# Patient Record
Sex: MALE | Race: WHITE | NOT HISPANIC OR LATINO | Employment: STUDENT | URBAN - METROPOLITAN AREA
[De-identification: names, ages, dates, MRNs, and addresses within clinical notes are randomized per-mention and may not be internally consistent; named-entity substitution may affect disease eponyms.]

---

## 2017-03-10 ENCOUNTER — OFFICE VISIT (OUTPATIENT)
Dept: URGENT CARE | Facility: URGENT CARE | Age: 22
End: 2017-03-10
Payer: COMMERCIAL

## 2017-03-10 VITALS
BODY MASS INDEX: 18.37 KG/M2 | HEART RATE: 115 BPM | DIASTOLIC BLOOD PRESSURE: 95 MMHG | OXYGEN SATURATION: 97 % | TEMPERATURE: 99.2 F | WEIGHT: 128 LBS | SYSTOLIC BLOOD PRESSURE: 152 MMHG

## 2017-03-10 DIAGNOSIS — B49 FUNGAL INFECTION: Primary | ICD-10-CM

## 2017-03-10 PROCEDURE — 99203 OFFICE O/P NEW LOW 30 MIN: CPT | Performed by: NURSE PRACTITIONER

## 2017-03-10 RX ORDER — FLUCONAZOLE 150 MG/1
150 TABLET ORAL ONCE
Qty: 2 TABLET | Refills: 0 | Status: SHIPPED | OUTPATIENT
Start: 2017-03-10 | End: 2017-03-10

## 2017-03-10 RX ORDER — KETOCONAZOLE 20 MG/G
CREAM TOPICAL 2 TIMES DAILY
Qty: 30 G | Refills: 0 | Status: SHIPPED | OUTPATIENT
Start: 2017-03-10 | End: 2017-03-17

## 2017-03-10 NOTE — MR AVS SNAPSHOT
After Visit Summary   3/10/2017    Dale Zhang    MRN: [id number]           Patient Information     Date Of Birth          1995        Visit Information        Provider Department      3/10/2017 11:25 AM Natalia Walters NP [facility]        Today's Diagnoses     Fungal infection    -  1      Care Instructions      Fungal Skin Infection (Tinea)  A fungal infection is when too much fungus grows on or in the body. Fungus normally lives on the skin in small amounts and does not cause harm. But when too much grows on the skin, it causes an infection. This is also known as tinea. Fungal skin infections are common and not often serious.  The infection often starts as a small red area the size of a pea. The skin may turn dry and flaky. The area may itch. As the fungus grows, it spreads out in a red [place]. Because of how it looks, fungal skin infection is often called ringworm, but it is not caused by a worm. Fungal skin infections can occur on many parts of the body. They can grow on the head, chest, arms, or legs. They can occur on the buttocks. On the feet, fungal infection is known as  athlete s foot.  It causes itchy, sometimes painful sores between the toes and the bottom or sides of the feet. In the groin, the rash is called  jock itch.   People with weakened immune systems can get a fungal infection more easily. This includes people with diabetes or HIV, or who are being treated for cancer. In these cases, the fungal infection can spread and cause severe illness. Fungal infections are also more common in people who are obese.  In most cases, treatment is done with antifungal cream or ointment. If the infection is on your scalp, you may take oral medication. In some cases, a tiny piece of the skin (biopsy) may be taken. This is so it can be tested in a lab.  Common fungal infections are treated with creams on the skin or oral medicine.  Home care  Follow all instructions when  using antifungal cream or ointment on your skin. The health care provider may advise using cornstarch powder to keep your skin dry or petroleum jelly to provide a barrier.  General care:    If you were prescribed an oral medicine, read the patient information. Talk with the health care provider about the risks and side effects.    Let your skin dry completely after bathing. Carefully dry your feet and between your toes.    Dress in loose cotton clothing.    Don t scratch the affected area. This can delay healing and may spread the infection. It can also cause a bacterial infection.    Keep your skin clean, but don t wash the skin too much. This can irritate your skin.    Keep in mind that it may take a week before the fungus starts to go away. It can take 2 to 4 weeks to fully clear. To prevent it from coming back, use the medicine until the rash is all gone.  Follow-up care  Follow up with your health care provider if the rash does not get better after 10 days of treatment. Also follow up if the rash spreads to other parts of your body.  When to seek medical advice  Call your health care provider right away if any of these occur:    Fever of 100.4 F (38 C) or higher    Redness or swelling that gets worse    Pain that gets worse    Foul-smelling fluid leaking from the skin       3811-2178 [employer]. [address] [place] [address], [place] [address]. All rights reserved. This information is not intended as a substitute for professional medical care. Always follow your healthcare professional's instructions.              Follow-ups after your visit        Who to contact     If you have questions or need follow up information about today's clinic visit or your schedule please contact [facility] directly at [phone].  Normal or non-critical lab and imaging results will be communicated to you by MyChart, letter or phone within 4 business days after the clinic has received the results. If  "you do not hear from us within 7 days, please contact the clinic through [employer] or phone. If you have a critical or abnormal lab result, we will notify you by phone as soon as possible.  Submit refill requests through [employer] or call your pharmacy and they will forward the refill request to us. Please allow 3 business days for your refill to be completed.          Additional Information About Your Visit        [employer] Information     [employer] lets you send messages to your doctor, view your test results, renew your prescriptions, schedule appointments and more. To sign up, go to [email or web address] . Click on \"Log in\" on the left side of the screen, which will take you to the Welcome page. Then click on \"Sign up Now\" on the right side of the page.     You will be asked to enter the access code listed below, as well as some personal information. Please follow the directions to create your username and password.     Your access code is: [email or web address]  Expires: 2017 12:37 PM     Your access code will  in 90 days. If you need help or a new code, please call your [place] clinic or [phone].        Care EveryWhere ID     This is your Care EveryWhere ID. This could be used by other organizations to access your [place] medical records  [id number]        Your Vitals Were     Pulse Temperature Pulse Oximetry BMI (Body Mass Index)          115 99.2  F (37.3  C) (Oral) 97% 18.37 kg/m2         Blood Pressure from Last 3 Encounters:   03/10/17 (!) 152/95   12 121/60   11 126/70    Weight from Last 3 Encounters:   03/10/17 128 lb (58.1 kg)   12 130 lb (59 kg) (17 %)*   11 128 lb (58.1 kg) (17 %)*     * Growth percentiles are based on CDC 2-20 Years data.              Today, you had the following     No orders found for display         Today's Medication Changes          These changes are accurate as of: 3/10/17 12:37 PM.  If you have any questions, ask your nurse or doctor.             "   Start taking these medicines.        Dose/Directions    fluconazole 150 MG tablet   Commonly known as:  DIFLUCAN   Used for:  Fungal infection   Started by:  Natalia Walters NP        Dose:  150 mg   Take 1 tablet (150 mg) by mouth once for 1 dose   Quantity:  2 tablet   Refills:  0       ketoconazole 2 % cream   Commonly known as:  NIZORAL   Used for:  Fungal infection   Started by:  Natalia Walters NP        Apply topically 2 times daily for 7 days   Quantity:  30 g   Refills:  0            Where to get your medicines      These medications were sent to [facility] [address] [facility] [address] [facility] SEC of [facility]  [address] [facility], [facility] [address]     Phone:  [phone]     fluconazole 150 MG tablet    ketoconazole 2 % cream                Primary Care Provider Office Phone # Fax #    Darrius Buenrostro [phone]       [facility] [address] [facility] [address]        Thank you!     Thank you for choosing [facility]  for your care. Our goal is always to provide you with excellent care. Hearing back from our patients is one way we can continue to improve our services. Please take a few minutes to complete the written survey that you may receive in the mail after your visit with us. Thank you!             Your Updated Medication List - Protect others around you: Learn how to safely use, store and throw away your medicines at www.disposemymeds.org.          This list is accurate as of: 3/10/17 12:37 PM.  Always use your most recent med list.                   Brand Name Dispense Instructions for use    clindamycin-benzoyl peroxide gel    BENZACLIN    50 g    Apply  topically 2 times daily. To the face, chest and back.       fluconazole 150 MG tablet    DIFLUCAN    2 tablet    Take 1 tablet (150 mg) by mouth once for 1 dose       ketoconazole 2 % cream    NIZORAL    30 g    Apply topically 2 times daily for 7  days       oseltamivir 75 MG capsule    TAMIFLU    10 capsule    Take 1 capsule by mouth 2 times daily.

## 2017-03-10 NOTE — NURSING NOTE
"No chief complaint on file.      Initial BP (!) 152/95 (BP Location: Left arm, Patient Position: Chair, Cuff Size: Adult Regular)  Pulse 115  Temp 99.2  F (37.3  C) (Oral)  Wt 128 lb (58.1 kg)  SpO2 97%  BMI 18.37 kg/m2 Estimated body mass index is 18.37 kg/(m^2) as calculated from the following:    Height as of 6/5/12: 5' 10\" (1.778 m).    Weight as of this encounter: 128 lb (58.1 kg).  Medication Reconciliation: complete     Gayle Trent CMA (AAMA)      "

## 2017-03-10 NOTE — PROGRESS NOTES
SUBJECTIVE:                                                    Dale Zhang is a 22 year old male who presents to clinic today for the following health issues:    Genital Irritation      Duration: last night    Description (location/character/radiation): Pt was masturbating yesterday evening and he used a lotion that irritated his foreskin and penis.     Intensity:  moderate    Accompanying signs and symptoms: swelling, redness, puffy, burning    History (similar episodes/previous evaluation): None    Precipitating or alleviating factors: None    Therapies tried and outcome: cleaning of the lotion- little relief.             Allergies   Allergen Reactions     Motrin [Ibuprofen] Unknown     Liquid- as a child.      Red Dye        Past Medical History   Diagnosis Date     Acne vulgaris      Allergies      Asthma, mild intermittent      Asthma, mild persistent 12/2005     Environmental allergies          Current Outpatient Prescriptions on File Prior to Visit:  oseltamivir (TAMIFLU) 75 MG capsule Take 1 capsule by mouth 2 times daily. (Patient not taking: Reported on 3/10/2017)   clindamycin-benzoyl peroxide (BENZACLIN) gel Apply  topically 2 times daily. To the face, chest and back. (Patient not taking: Reported on 3/10/2017)     No current facility-administered medications on file prior to visit.     Social History   Substance Use Topics     Smoking status: Never Smoker     Smokeless tobacco: Never Used     Alcohol use No       ROS:  General: negative for fever  SKIN: + as above    Physcial Exam:  BP (!) 152/95 (BP Location: Left arm, Patient Position: Chair, Cuff Size: Adult Regular)  Pulse 115  Temp 99.2  F (37.3  C) (Oral)  Wt 128 lb (58.1 kg)  SpO2 97%  BMI 18.37 kg/m2    GENERAL: alert, no acute distress  EYES: conjunctival clear  RESP: Regular breathing rate  NEURO: awake .  SKIN: excoriated penile shaft and glans of penis with white debri, tender to touch    ASSESSMENT:    ICD-10-CM    1. Fungal  infection B49 ketoconazole (NIZORAL) 2 % cream     fluconazole (DIFLUCAN) 150 MG tablet       PLAN:  See today's orders.  Follow-up with primary clinic if not improving.  Advised about symptoms which might herald more serious problems.    Natalia Walters  [facility]  Family Nurse Practitoner

## 2017-03-10 NOTE — PATIENT INSTRUCTIONS
Fungal Skin Infection (Tinea)  A fungal infection is when too much fungus grows on or in the body. Fungus normally lives on the skin in small amounts and does not cause harm. But when too much grows on the skin, it causes an infection. This is also known as tinea. Fungal skin infections are common and not often serious.  The infection often starts as a small red area the size of a pea. The skin may turn dry and flaky. The area may itch. As the fungus grows, it spreads out in a red [place]. Because of how it looks, fungal skin infection is often called ringworm, but it is not caused by a worm. Fungal skin infections can occur on many parts of the body. They can grow on the head, chest, arms, or legs. They can occur on the buttocks. On the feet, fungal infection is known as  athlete s foot.  It causes itchy, sometimes painful sores between the toes and the bottom or sides of the feet. In the groin, the rash is called  jock itch.   People with weakened immune systems can get a fungal infection more easily. This includes people with diabetes or HIV, or who are being treated for cancer. In these cases, the fungal infection can spread and cause severe illness. Fungal infections are also more common in people who are obese.  In most cases, treatment is done with antifungal cream or ointment. If the infection is on your scalp, you may take oral medication. In some cases, a tiny piece of the skin (biopsy) may be taken. This is so it can be tested in a lab.  Common fungal infections are treated with creams on the skin or oral medicine.  Home care  Follow all instructions when using antifungal cream or ointment on your skin. The health care provider may advise using cornstarch powder to keep your skin dry or petroleum jelly to provide a barrier.  General care:    If you were prescribed an oral medicine, read the patient information. Talk with the health care provider about the risks and side effects.    Let your skin dry  completely after bathing. Carefully dry your feet and between your toes.    Dress in loose cotton clothing.    Don t scratch the affected area. This can delay healing and may spread the infection. It can also cause a bacterial infection.    Keep your skin clean, but don t wash the skin too much. This can irritate your skin.    Keep in mind that it may take a week before the fungus starts to go away. It can take 2 to 4 weeks to fully clear. To prevent it from coming back, use the medicine until the rash is all gone.  Follow-up care  Follow up with your health care provider if the rash does not get better after 10 days of treatment. Also follow up if the rash spreads to other parts of your body.  When to seek medical advice  Call your health care provider right away if any of these occur:    Fever of 100.4 F (38 C) or higher    Redness or swelling that gets worse    Pain that gets worse    Foul-smelling fluid leaking from the skin       3618-5519 The "[employer]. [address] [place] [address], [place] [address]. All rights reserved. This information is not intended as a substitute for professional medical care. Always follow your healthcare professional's instructions.

## 2017-06-09 ENCOUNTER — TELEPHONE (OUTPATIENT)
Dept: FAMILY MEDICINE | Facility: CLINIC | Age: 22
End: 2017-06-09

## 2017-06-09 ENCOUNTER — OFFICE VISIT (OUTPATIENT)
Dept: FAMILY MEDICINE | Facility: CLINIC | Age: 22
End: 2017-06-09
Payer: COMMERCIAL

## 2017-06-09 VITALS
HEART RATE: 98 BPM | WEIGHT: 128 LBS | BODY MASS INDEX: 18.32 KG/M2 | TEMPERATURE: 97 F | SYSTOLIC BLOOD PRESSURE: 132 MMHG | OXYGEN SATURATION: 97 % | DIASTOLIC BLOOD PRESSURE: 81 MMHG | HEIGHT: 70 IN

## 2017-06-09 DIAGNOSIS — Z00.00 ENCOUNTER FOR ROUTINE ADULT HEALTH EXAMINATION WITHOUT ABNORMAL FINDINGS: Primary | ICD-10-CM

## 2017-06-09 DIAGNOSIS — Z01.84 IMMUNITY STATUS TESTING: ICD-10-CM

## 2017-06-09 DIAGNOSIS — Z11.1 SCREENING FOR TUBERCULOSIS: ICD-10-CM

## 2017-06-09 LAB
HBV SURFACE AB SERPL IA-ACNC: 81.49 M[IU]/ML
VZV IGG SER QL IA: 2.4 AI (ref 0–0.8)

## 2017-06-09 PROCEDURE — 86787 VARICELLA-ZOSTER ANTIBODY: CPT | Performed by: PHYSICIAN ASSISTANT

## 2017-06-09 PROCEDURE — 99395 PREV VISIT EST AGE 18-39: CPT | Performed by: PHYSICIAN ASSISTANT

## 2017-06-09 PROCEDURE — 86706 HEP B SURFACE ANTIBODY: CPT | Performed by: PHYSICIAN ASSISTANT

## 2017-06-09 PROCEDURE — 36415 COLL VENOUS BLD VENIPUNCTURE: CPT | Performed by: PHYSICIAN ASSISTANT

## 2017-06-09 PROCEDURE — 86480 TB TEST CELL IMMUN MEASURE: CPT | Performed by: PHYSICIAN ASSISTANT

## 2017-06-09 NOTE — MR AVS SNAPSHOT
After Visit Summary   6/9/2017    Dale Zhang    MRN: [id number]           Patient Information     Date Of Birth          1995        Visit Information        Provider Department      6/9/2017 9:00 AM Cindy Jean Baptiste PA-C [facility]        Today's Diagnoses     Encounter for routine adult health examination without abnormal findings    -  1    Immunity status testing        Screening for tuberculosis          Care Instructions      Preventive Health Recommendations  Male Ages 18 - 25     Yearly exam:             See your health care provider every year in order to  o   Review health changes.   o   Discuss preventive care.    o   Review your medicines if your doctor has prescribed any.    You should be tested each year for STDs (sexually transmitted diseases).     Talk to your provider about cholesterol testing.      If you are at risk for diabetes, you should have a diabetes test (fasting glucose).    Shots: Get a flu shot each year. Get a tetanus shot every 10 years.     Nutrition:    Eat at least 5 servings of fruits and vegetables daily.     Eat whole-grain bread, whole-wheat pasta and brown rice instead of white grains and rice.     Talk to your provider about calcium and Vitamin D.     Lifestyle    Exercise for at least 150 minutes a week (30 minutes a day, 5 days a week). This will help you control your weight and prevent disease.     Limit alcohol to one drink per day.     No smoking.     Wear sunscreen to prevent skin cancer.     See your dentist every six months for an exam and cleaning.             Follow-ups after your visit        Who to contact     If you have questions or need follow up information about today's clinic visit or your schedule please contact [facility] directly at [phone].  Normal or non-critical lab and imaging results will be communicated to you by MyChart, letter or phone within 4 business days after the clinic has  "received the results. If you do not hear from us within 7 days, please contact the clinic through [employer] or phone. If you have a critical or abnormal lab result, we will notify you by phone as soon as possible.  Submit refill requests through [employer] or call your pharmacy and they will forward the refill request to us. Please allow 3 business days for your refill to be completed.          Additional Information About Your Visit        [employer] Information     [employer] lets you send messages to your doctor, view your test results, renew your prescriptions, schedule appointments and more. To sign up, go to [email or web address] . Click on \"Log in\" on the left side of the screen, which will take you to the Welcome page. Then click on \"Sign up Now\" on the right side of the page.     You will be asked to enter the access code listed below, as well as some personal information. Please follow the directions to create your username and password.     Your access code is: [email or web address]  Expires: 2017  9:22 AM     Your access code will  in 90 days. If you need help or a new code, please call your [place] clinic or [phone].        Care EveryWhere ID     This is your Care EveryWhere ID. This could be used by other organizations to access your [place] medical records  [id number]        Your Vitals Were     Pulse Temperature Height Pulse Oximetry BMI (Body Mass Index)       98 97  F (36.1  C) (Oral) 5' 10\" (1.778 m) 97% 18.37 kg/m2        Blood Pressure from Last 3 Encounters:   17 132/81   03/10/17 (!) 152/95   12 121/60    Weight from Last 3 Encounters:   17 128 lb (58.1 kg)   03/10/17 128 lb (58.1 kg)   12 130 lb (59 kg) (17 %)*     * Growth percentiles are based on CDC 2-20 Years data.              We Performed the Following     Hepatitis B Surface Antibody     M Tuberculosis by Quantiferon     Varicella Zoster Virus Antibody IgG          Today's Medication Changes          These " changes are accurate as of: 6/9/17  9:22 AM.  If you have any questions, ask your nurse or doctor.               Stop taking these medicines if you haven't already. Please contact your care team if you have questions.     clindamycin-benzoyl peroxide gel   Commonly known as:  BENZACLIN   Stopped by:  Cindy Jean Baptiste PA-C           oseltamivir 75 MG capsule   Commonly known as:  TAMIFLU   Stopped by:  Cindy Jean Baptiste PA-C                    Primary Care Provider Office Phone # Fax #    Darrius Buenrostro [phone]       [facility] [address] [facility] [address]        Thank you!     Thank you for choosing [facility]  for your care. Our goal is always to provide you with excellent care. Hearing back from our patients is one way we can continue to improve our services. Please take a few minutes to complete the written survey that you may receive in the mail after your visit with us. Thank you!             Your Updated Medication List - Protect others around you: Learn how to safely use, store and throw away your medicines at www.disposemymeds.org.      Notice  As of 6/9/2017  9:22 AM    You have not been prescribed any medications.

## 2017-06-09 NOTE — PROGRESS NOTES
SUBJECTIVE:     CC: Dale Zhang is an 22 year old male who presents for preventative health visit.     Healthy Habits:    Do you get at least three servings of calcium containing foods daily (dairy, green leafy vegetables, etc.)? yes    Amount of exercise or daily activities, outside of work: 2 day(s) per week    Problems taking medications regularly No    Medication side effects: No    Have you had an eye exam in the past two years? Going in next week    Do you see a dentist twice per year? no    Do you have sleep apnea, excessive snoring or daytime drowsiness?no    Needs titers and TB screening for school. He has forms to be filled out. 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         Today's PHQ-2 Score:   PHQ-2 ( 1999 [employer]) 6/9/2017 12/9/2011   Q1: Little interest or pleasure in doing things 0 0   Q2: Feeling down, depressed or hopeless 0 0   PHQ-2 Score 0 0       Abuse: Current or Past(Physical, Sexual or Emotional)- No  Do you feel safe in your environment - Yes    Social History   Substance Use Topics     Smoking status: Never Smoker     Smokeless tobacco: Never Used     Alcohol use No     The patient does not drink >3 drinks per day nor >7 drinks per week.    Last PSA: No results found for: PSA    No results for input(s): CHOL, HDL, LDL, TRIG, CHOLHDLRATIO, NHDL in the last 15427 hours.    Reviewed orders with patient. Reviewed health maintenance and updated orders accordingly - Yes    Reviewed and updated as needed this visit by clinical staff  Tobacco  Allergies  Meds  Med Hx  Surg Hx  Fam Hx  Soc Hx        Reviewed and updated as needed this visit by Provider            ROS:  C: NEGATIVE for fever, chills, change in weight  I: NEGATIVE for worrisome rashes, moles or lesions  E: NEGATIVE for vision changes or irritation  ENT: NEGATIVE for ear, mouth and throat problems  R: NEGATIVE for significant cough or SOB  CV: NEGATIVE for chest pain, palpitations or peripheral edema  GI: NEGATIVE for  "nausea, abdominal pain, heartburn, or change in bowel habits   male: negative for dysuria, hematuria, decreased urinary stream, erectile dysfunction, urethral discharge  M: NEGATIVE for significant arthralgias or myalgia  N: NEGATIVE for weakness, dizziness or paresthesias  P: NEGATIVE for changes in mood or affect    Problem list, Medication list, Allergies, and Medical/Social/Surgical histories reviewed in [facility] and updated as appropriate.  BP Readings from Last 3 Encounters:   06/09/17 132/81   03/10/17 (!) 152/95   06/05/12 121/60    Wt Readings from Last 3 Encounters:   06/09/17 128 lb (58.1 kg)   03/10/17 128 lb (58.1 kg)   06/05/12 130 lb (59 kg) (17 %)*     * Growth percentiles are based on [facility] 2-20 Years data.                  Patient Active Problem List   Diagnosis     Acne vulgaris     Environmental allergies     Verruca vulgaris     Past Surgical History:   Procedure Laterality Date     PE TUBES         Social History   Substance Use Topics     Smoking status: Never Smoker     Smokeless tobacco: Never Used     Alcohol use No     Family History   Problem Relation Age of Onset     Hypertension Father      DIABETES Father      DIABETES Paternal Grandmother      DIABETES Paternal Uncle          No current outpatient prescriptions on file.     Allergies   Allergen Reactions     Motrin [Ibuprofen] Unknown     Liquid- as a child.      Red Dye      OBJECTIVE:     /81  Pulse 98  Temp 97  F (36.1  C) (Oral)  Ht 5' 10\" (1.778 m)  Wt 128 lb (58.1 kg)  SpO2 97%  BMI 18.37 kg/m2  EXAM:  GENERAL: healthy, alert and no distress  EYES: Eyes grossly normal to inspection, PERRL and conjunctivae and sclerae normal  HENT: ear canals and TM's normal, nose and mouth without ulcers or lesions  NECK: no adenopathy, no asymmetry, masses, or scars and thyroid normal to palpation  RESP: lungs clear to auscultation - no rales, rhonchi or wheezes  CV: regular rate and rhythm, normal S1 S2, no S3 or S4, no murmur, click " "or rub, no peripheral edema and peripheral pulses strong  ABDOMEN: soft, nontender, no hepatosplenomegaly, no masses and bowel sounds normal   (male): deferred  MS: no gross musculoskeletal defects noted, no edema  SKIN: no suspicious lesions or rashes  NEURO: Normal strength and tone, mentation intact and speech normal  PSYCH: mentation appears normal, affect normal/bright    ASSESSMENT/PLAN:         ICD-10-CM    1. Encounter for routine adult health examination without abnormal findings Z00.00    2. Immunity status testing Z01.84 Varicella Zoster Virus Antibody IgG     Hepatitis B Surface Antibody   3. Screening for tuberculosis Z11.1 M Tuberculosis by Quantiferon       COUNSELING:  Reviewed preventive health counseling, as reflected in patient instructions       Regular exercise       Healthy diet/nutrition         reports that he has never smoked. He has never used smokeless tobacco.    Estimated body mass index is 18.37 kg/(m^2) as calculated from the following:    Height as of this encounter: 5' 10\" (1.778 m).    Weight as of this encounter: 128 lb (58.1 kg).       Counseling Resources:  ATP IV Guidelines  Pooled Cohorts Equation Calculator  FRAX Risk Assessment  ICSI Preventive Guidelines  Dietary Guidelines for Americans, 2010  USDA's MyPlate  ASA Prophylaxis  Lung CA Screening    Cindy Jean Baptiste PA-C  [facility]  "

## 2017-06-09 NOTE — TELEPHONE ENCOUNTER
Forms are completed for the patient for school. TC has the forms. Once lab results are finalized and are normal, please attach to forms and inform patient they are ready for . Thank you.    Cindy Jean Baptiste PA-C

## 2017-06-09 NOTE — LETTER
[place] [id number] [employer] [id number] [phone]        June 13, 2017    Dale RUSSELL Zhang  [address] [address] [address]            Dear Dale,    Your provider is out of the clinic.  Your recent labs were positive for immunity to Hepatitis B and chicken pox. Your TB test was negative.   If you have any questions, please contact your provider next week.     Ingris Mark MD/steffen    Results for orders placed or performed in visit on 06/09/17   Varicella Zoster Virus Antibody IgG   Result Value Ref Range    Varicella Zoster Virus Antibody IgG 2.4 (H) 0.0 - 0.8 AI   M Tuberculosis by Quantiferon   Result Value Ref Range    M Tuberculosis Result Negative NEG    M Tuberculosis Antigen Value 0.00 IU/mL   Hepatitis B Surface Antibody   Result Value Ref Range    Hepatitis B Surface Antibody 81.49 (H) <8.00 m[IU]/mL

## 2017-06-09 NOTE — NURSING NOTE
"Chief Complaint   Patient presents with     Physical       Initial /81  Pulse 98  Temp 97  F (36.1  C) (Oral)  Ht 5' 10\" (1.778 m)  Wt 128 lb (58.1 kg)  SpO2 97%  BMI 18.37 kg/m2 Estimated body mass index is 18.37 kg/(m^2) as calculated from the following:    Height as of this encounter: 5' 10\" (1.778 m).    Weight as of this encounter: 128 lb (58.1 kg).  Medication Reconciliation: complete     Tyesha Crane cma       "

## 2017-06-12 LAB
M TB TUBERC IFN-G BLD QL: NEGATIVE
M TB TUBERC IFN-G/MITOGEN IGNF BLD: 0 IU/ML

## 2017-06-13 NOTE — TELEPHONE ENCOUNTER
Forms completed, lab reports printed for each requested test. LM for patient letting him know this is now at the  for patient to .    Left direct call back number for questions.    Bathseva Simpson,

## 2017-06-15 ENCOUNTER — TELEPHONE (OUTPATIENT)
Dept: FAMILY MEDICINE | Facility: CLINIC | Age: 22
End: 2017-06-15

## 2017-06-15 NOTE — TELEPHONE ENCOUNTER
Reason for call:  Form   Our goal is to have forms completed within 72 hours, however some forms may require a visit or additional information.     Who is the form from? Patient  Where did the form come from? Patient or family brought in     What clinic location was the form placed at?   Where was the form placed? 's Box  What number is listed as a contact on the form? [phone]    Phone call message - patient request for a letter, form or note:     Date needed: at your convenience  Please mail to -yevgeniy rodríguez and pt would like to  at    Has the patient signed a consent form for release of information? Not Applicable    Additional comments: mail forms and leave a copy for pt to  at

## 2017-06-15 NOTE — TELEPHONE ENCOUNTER
Patient called back, all that needs to be done is Cindy needs to initial the patient's family history form. He states this can wait until Cindy is back on Monday.    Then it needs to be mailed in the provided envelope and a copy of the forms for the patient.    Batsheva Simpson,

## 2017-06-20 NOTE — TELEPHONE ENCOUNTER
Cindy initialed the form, the form and records were mailed in envelope patient provided with postage already on it.    Copy of the forms were left at the  for patient to  when he is able.    Notified patient this was done.    Batsheva Simpson,

## 2020-06-08 ENCOUNTER — ALLIED HEALTH/NURSE VISIT (OUTPATIENT)
Dept: NURSING | Facility: CLINIC | Age: 25
End: 2020-06-08

## 2020-06-08 DIAGNOSIS — Z11.1 SCREENING EXAMINATION FOR PULMONARY TUBERCULOSIS: Primary | ICD-10-CM

## 2020-06-08 PROCEDURE — 86580 TB INTRADERMAL TEST: CPT

## 2020-06-08 PROCEDURE — 99207 ZZC NO CHARGE NURSE ONLY: CPT

## 2020-06-08 NOTE — PROGRESS NOTES
The patient is asked the following questions today and these are his answers:    -Have you had a mantoux administered in the past 30 days?    No  -Have you had a previous positive Mantoux.  No  -Have you received BCG in the past.  No  -Have you had a live vaccine  (MMR, Varicella, OPV, Yellow Fever) in the last 6 weeks.  No  -Have you had and active  viral or bacterial infection in the past 6 weeks.  No  -Have you received corticosteroids or immunosuppressive agents in the past 6 weeks.  No  -Have you been diagnosed with HIV?  No  -Do you have a malignancy?  No    Mantoux Questionnaire: answers were all negative.  Cindi Cobb MA

## 2020-06-10 ENCOUNTER — ALLIED HEALTH/NURSE VISIT (OUTPATIENT)
Dept: NURSING | Facility: CLINIC | Age: 25
End: 2020-06-10

## 2020-06-10 DIAGNOSIS — Z11.1 SCREENING EXAMINATION FOR PULMONARY TUBERCULOSIS: Primary | ICD-10-CM

## 2020-06-10 LAB
PPDINDURATION: 0 MM (ref 0–5)
PPDREDNESS: 0 MM

## 2020-06-10 PROCEDURE — 99207 ZZC NO CHARGE NURSE ONLY: CPT

## 2021-01-06 ENCOUNTER — MYC MEDICAL ADVICE (OUTPATIENT)
Dept: FAMILY MEDICINE | Facility: CLINIC | Age: 26
End: 2021-01-06

## 2021-01-06 NOTE — TELEPHONE ENCOUNTER
Patient has not seen Dr. Darrius Buenrostro since 2011.  Patient needs to be seen in person and will not be able to do a virtual visit for this.   [employer] message sent to patient.      Johanna GREWAL, RN

## 2021-01-07 ENCOUNTER — OFFICE VISIT (OUTPATIENT)
Dept: FAMILY MEDICINE | Facility: CLINIC | Age: 26
End: 2021-01-07
Payer: COMMERCIAL

## 2021-01-07 VITALS
BODY MASS INDEX: 17.9 KG/M2 | TEMPERATURE: 99.4 F | HEIGHT: 70 IN | HEART RATE: 103 BPM | WEIGHT: 125 LBS | DIASTOLIC BLOOD PRESSURE: 94 MMHG | SYSTOLIC BLOOD PRESSURE: 137 MMHG

## 2021-01-07 DIAGNOSIS — F41.1 GAD (GENERALIZED ANXIETY DISORDER): Primary | ICD-10-CM

## 2021-01-07 PROCEDURE — 99214 OFFICE O/P EST MOD 30 MIN: CPT | Performed by: FAMILY MEDICINE

## 2021-01-07 RX ORDER — NALTREXONE HYDROCHLORIDE 50 MG/1
25 TABLET, FILM COATED ORAL
COMMUNITY
Start: 2021-01-03

## 2021-01-07 RX ORDER — LANOLIN ALCOHOL/MO/W.PET/CERES
100 CREAM (GRAM) TOPICAL
COMMUNITY
Start: 2021-01-04

## 2021-01-07 RX ORDER — ESCITALOPRAM OXALATE 5 MG/1
5 TABLET ORAL DAILY
Qty: 30 TABLET | Refills: 1 | Status: SHIPPED | OUTPATIENT
Start: 2021-01-07 | End: 2021-02-22

## 2021-01-07 ASSESSMENT — PAIN SCALES - GENERAL: PAINLEVEL: NO PAIN (0)

## 2021-01-07 ASSESSMENT — MIFFLIN-ST. JEOR: SCORE: 1548.25

## 2021-01-07 NOTE — NURSING NOTE
"Chief Complaint   Patient presents with     [facility] F/U     Health Maintenance     order pended, PHQ2, Physical       Initial BP (!) 137/94   Pulse 103   Temp 99.4  F (37.4  C) (Tympanic)   Ht 1.778 m (5' 10\")   Wt 56.7 kg (125 lb)   BMI 17.94 kg/m   Estimated body mass index is 17.94 kg/m  as calculated from the following:    Height as of this encounter: 1.778 m (5' 10\").    Weight as of this encounter: 56.7 kg (125 lb).  Medication Reconciliation: complete  Tyesha Crane, BELA  "

## 2021-01-07 NOTE — PATIENT INSTRUCTIONS
Say manju to insomnia           Patient Education     Sty (or Stye)  A sty is an infection of the oil gland of the eyelid. It may develop into a small pocket of pus (an abscess). This can cause pain, redness, and swelling. In early stages, a sty is treated with antibiotic cream, eye drops, or a small towel soaked in warm water (a warm compress). More severe cases may need to be opened and drained by a healthcare provider.  Home care    Eye drops or ointment are usually prescribed to treat the infection. Use these as directed.     Artificial tears may also be used to lubricate the eye and make it more comfortable. You can buy these over the counter without a prescription. Talk with your healthcare provider before using any over-the-counter treatment for a sty.    Apply a warm, damp towel to the affected eye for at least 5 minutes, 3 to 4 times a day for a week. Warm compresses open the pores and speed the healing. But if the compresses are too hot, they may burn your eyelid.    Sometimes the sty will drain with this treatment alone. If this happens, keep using the antibiotic until all the redness and swelling are gone.    Wash your hands before and after touching the infected eyelid to avoid spreading the infection.    Don t squeeze or try to break open the sty.  Follow-up care  Follow up with your healthcare provider, or as advised.   When to seek medical advice  Call your healthcare provider right away if any of these occur:    Increase in swelling or redness around the eyelid after 48 to 72 hours    Increase in eye pain or the eyelid blisters    Increase in warmth--the eyelid feels hot    Drainage of blood or thick pus from the sty    Blister on the eyelid    Inability to open the eyelid due to swelling    Fever of 100.4 F (38 C) or above, or as directed by your provider    Vision changes    Headache or stiff neck    The sty comes back  [employer] last reviewed this educational content on 8/1/2017 2000-2020  [employer]. [address] [place] [address], [place] [address]. All rights reserved. This information is not intended as a substitute for professional medical care. Always follow your healthcare professional's instructions.

## 2021-01-07 NOTE — PROGRESS NOTES
Subjective     Dale is a 26 year old who presents to clinic today for the following health issues     HPI         Hospital Follow-up Visit:    Hospital/Nursing Home/IP Rehab Facility: [facility]  Date of Admission: 1/2/21  Date of Discharge: 1/3/21  Reason(s) for Admission: Seizure      Was your hospitalization related to COVID-19? No   Problems taking medications regularly:  None  Medication changes since discharge: None  Problems adhering to non-medication therapy:  None    Summary of hospitalization:  CareEverywhere information obtained and reviewed  Diagnostic Tests/Treatments reviewed.  Follow up needed: platelets  Other Healthcare Providers Involved in Patient s Care:         .  Update since discharge: improved. Post Discharge Medication Reconciliation: discharge medications reconciled, continue medications without change.  Plan of care communicated with patient                  Review of Systems         Objective    There were no vitals taken for this visit.  There is no height or weight on file to calculate BMI.  Physical Exam                 --------------------------------------------------------------------------------------------------------------------------------------  Subjective:  Dale is a 26 year old male who presents today for follow-up on a recent hospitalization at [facility] in [place]. He was admitted to the hospital on 1-2-21 and discharged on 1-3-21. The patient went to the hospital after having a witnessed seizure(s) . He was diagnosed with alcohol withdrawal seizure(s) . The patient was treated with naltrexone and thiamine. Naltrexone was just started 2 days ago. He was asked to follow up today specifically to check up on his general well condition. At this time the patient reports a lot of improvement of the original symptoms. In addition the patient reports the following new symptoms:none.     He has not had any alcohol since he left the hospital   He was  "drinkinh heavily for 6 month(s) prior to this event.  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 He had difficulty studying for boards which he ended up failing.      He was on escitalopram 20 mg but he had side effects of delayed orgasm     PHQ-9 score: 14        ALISIA-7    Over the last 2 weeks, how often have you been bothered by the following problems?  (Use an \"x\" to indicate your answer) Not at all                (0) Several days                (1) More than half the days        (2) Nearly every day          (3)   1. Feeling nervous, anxious or on edge    x   2. Not being able to stop or control worrying    x   3. Worrying too much about different things   x    4. Trouble relaxing  x     5. Being so restless that it is hard to sit still  x     6. Becoming easily annoyed or irritable   x    7. Feeling afraid as if something awful might happen   x          Total_______    Cut points for:   Mild Anxiety =  5  Moderate= 10  Severe=  15                        Current Outpatient Medications:      naltrexone (DEPADE/REVIA) 50 MG tablet, Take 25 mg by mouth, Disp: , Rfl:      thiamine (B-1) 100 MG tablet, Take 100 mg by mouth, Disp: , Rfl:     Past Medical History:   Diagnosis Date     Acne vulgaris      Allergies      Asthma, mild intermittent      Asthma, mild persistent 12/2005     Environmental allergies        OBJECTIVE:  BP (!) 137/94   Pulse 103   Temp 99.4  F (37.4  C) (Tympanic)   Ht 1.778 m (5' 10\")   Wt 56.7 kg (125 lb)   BMI 17.94 kg/m      ASSESSMENT:  26 year old male who recently underwent hospitalization for ETOH withdawl seizure(s)         Plan: At this time " we start escitalopram 5 mg continue(s) the naltrexone and thiamine  Follow up in a month(s) for a recheck   We will do labs at that visit including a cbc, liver function tests and thiamine level.     Dale  voiced understanding to informations discussed today.

## 2021-01-08 ASSESSMENT — ANXIETY QUESTIONNAIRES
1. FEELING NERVOUS, ANXIOUS, OR ON EDGE: NEARLY EVERY DAY
3. WORRYING TOO MUCH ABOUT DIFFERENT THINGS: MORE THAN HALF THE DAYS
6. BECOMING EASILY ANNOYED OR IRRITABLE: MORE THAN HALF THE DAYS
2. NOT BEING ABLE TO STOP OR CONTROL WORRYING: NEARLY EVERY DAY
7. FEELING AFRAID AS IF SOMETHING AWFUL MIGHT HAPPEN: MORE THAN HALF THE DAYS
GAD7 TOTAL SCORE: 14
IF YOU CHECKED OFF ANY PROBLEMS ON THIS QUESTIONNAIRE, HOW DIFFICULT HAVE THESE PROBLEMS MADE IT FOR YOU TO DO YOUR WORK, TAKE CARE OF THINGS AT HOME, OR GET ALONG WITH OTHER PEOPLE: SOMEWHAT DIFFICULT
5. BEING SO RESTLESS THAT IT IS HARD TO SIT STILL: SEVERAL DAYS

## 2021-01-08 ASSESSMENT — PATIENT HEALTH QUESTIONNAIRE - PHQ9
5. POOR APPETITE OR OVEREATING: SEVERAL DAYS
SUM OF ALL RESPONSES TO PHQ QUESTIONS 1-9: 14

## 2021-01-09 ASSESSMENT — ANXIETY QUESTIONNAIRES: GAD7 TOTAL SCORE: 14

## 2021-01-15 ENCOUNTER — HEALTH MAINTENANCE LETTER (OUTPATIENT)
Age: 26
End: 2021-01-15

## 2021-02-22 ENCOUNTER — TELEPHONE (OUTPATIENT)
Dept: FAMILY MEDICINE | Facility: CLINIC | Age: 26
End: 2021-02-22

## 2021-02-22 DIAGNOSIS — F41.1 GAD (GENERALIZED ANXIETY DISORDER): ICD-10-CM

## 2021-02-22 RX ORDER — ESCITALOPRAM OXALATE 5 MG/1
5 TABLET ORAL DAILY
Qty: 30 TABLET | Refills: 1 | Status: SHIPPED | OUTPATIENT
Start: 2021-02-22 | End: 2021-03-05

## 2021-02-22 NOTE — TELEPHONE ENCOUNTER
Patient is calling to get a refill for   escitalopram (LEXAPRO) 5 MG tablet 30-90 days  He is entering into a treatment facility this week and needs a new prescription(s) to bring with him.   He knows he was supposed to be seen for follow up but decided to enter treatment.  He would like thi sent to a different pharmacy as he is currently staying in [place]. Please sent to [facility] in [place].   Call patient to let him know when this is done.

## 2021-04-27 ENCOUNTER — IMMUNIZATION (OUTPATIENT)
Dept: NURSING | Facility: CLINIC | Age: 26
End: 2021-04-27
Payer: COMMERCIAL

## 2021-04-27 PROCEDURE — 0001A PR COVID VAC PFIZER DIL RECON 30 MCG/0.3 ML IM: CPT

## 2021-04-27 PROCEDURE — 91300 PR COVID VAC PFIZER DIL RECON 30 MCG/0.3 ML IM: CPT

## 2021-05-18 ENCOUNTER — IMMUNIZATION (OUTPATIENT)
Dept: NURSING | Facility: CLINIC | Age: 26
End: 2021-05-18
Attending: INTERNAL MEDICINE
Payer: COMMERCIAL

## 2021-05-18 PROCEDURE — 0002A PR COVID VAC PFIZER DIL RECON 30 MCG/0.3 ML IM: CPT

## 2021-05-18 PROCEDURE — 91300 PR COVID VAC PFIZER DIL RECON 30 MCG/0.3 ML IM: CPT

## 2021-08-18 ENCOUNTER — ALLIED HEALTH/NURSE VISIT (OUTPATIENT)
Dept: PEDIATRICS | Facility: CLINIC | Age: 26
End: 2021-08-18
Payer: COMMERCIAL

## 2021-08-18 DIAGNOSIS — Z11.1 ENCOUNTER FOR TUBERCULIN SKIN TEST: Primary | ICD-10-CM

## 2021-08-18 PROCEDURE — 86580 TB INTRADERMAL TEST: CPT

## 2021-08-18 PROCEDURE — 99207 PR NO CHARGE NURSE ONLY: CPT

## 2021-08-18 NOTE — PROGRESS NOTES
The patient is asked the following questions today and these are his answers:    -Have you had a mantoux administered in the past 30 days?    No  -Have you had a previous positive Mantoux.  No  -Have you received BCG in the past.  No  -Have you had a live vaccine  (MMR, Varicella, OPV, Yellow Fever) in the last 6 weeks.  No  -Have you had and active  viral or bacterial infection in the past 6 weeks.  No  -Have you received corticosteroids or immunosuppressive agents in the past 6 weeks.  No  -Have you been diagnosed with HIV?  No  -Do you have a malignancy?  No    Mantoux Questionnaire: answers were all negative.

## 2021-08-20 ENCOUNTER — ALLIED HEALTH/NURSE VISIT (OUTPATIENT)
Dept: PEDIATRICS | Facility: CLINIC | Age: 26
End: 2021-08-20
Payer: COMMERCIAL

## 2021-08-20 DIAGNOSIS — Z11.1 SCREENING EXAMINATION FOR PULMONARY TUBERCULOSIS: Primary | ICD-10-CM

## 2021-08-20 LAB
PPDINDURATION: 0 MM (ref 0–5)
PPDREDNESS: 1 MM

## 2021-08-20 PROCEDURE — 99207 PR NO CHARGE NURSE ONLY: CPT

## 2021-08-20 NOTE — PROGRESS NOTES
Mantoux result:  Lab Results   Component Value Date    PPDREDNESS 1 08/20/2021    PPDINDURATIO 0 08/20/2021     Is induration greater than 5mm?  No     Alfred Brennan RN on 8/20/2021 at 4:50 PM

## 2021-08-26 ENCOUNTER — ALLIED HEALTH/NURSE VISIT (OUTPATIENT)
Dept: FAMILY MEDICINE | Facility: CLINIC | Age: 26
End: 2021-08-26
Payer: COMMERCIAL

## 2021-08-26 DIAGNOSIS — Z23 NEED FOR VACCINATION: Primary | ICD-10-CM

## 2021-08-26 PROCEDURE — 90715 TDAP VACCINE 7 YRS/> IM: CPT

## 2021-08-26 PROCEDURE — 90471 IMMUNIZATION ADMIN: CPT

## 2021-08-26 PROCEDURE — 99207 PR NO CHARGE NURSE ONLY: CPT

## 2021-09-26 ENCOUNTER — HEALTH MAINTENANCE LETTER (OUTPATIENT)
Age: 26
End: 2021-09-26

## 2022-01-16 ENCOUNTER — HEALTH MAINTENANCE LETTER (OUTPATIENT)
Age: 27
End: 2022-01-16

## 2023-01-08 ENCOUNTER — HEALTH MAINTENANCE LETTER (OUTPATIENT)
Age: 28
End: 2023-01-08

## 2023-04-23 ENCOUNTER — HEALTH MAINTENANCE LETTER (OUTPATIENT)
Age: 28
End: 2023-04-23

## 2024-06-29 ENCOUNTER — HEALTH MAINTENANCE LETTER (OUTPATIENT)
Age: 29
End: 2024-06-29